# Patient Record
Sex: FEMALE | Race: BLACK OR AFRICAN AMERICAN | NOT HISPANIC OR LATINO | Employment: PART TIME | URBAN - METROPOLITAN AREA
[De-identification: names, ages, dates, MRNs, and addresses within clinical notes are randomized per-mention and may not be internally consistent; named-entity substitution may affect disease eponyms.]

---

## 2022-04-22 ENCOUNTER — TELEPHONE (OUTPATIENT)
Dept: NEUROSURGERY | Facility: CLINIC | Age: 72
End: 2022-04-22
Payer: MEDICARE

## 2022-04-22 NOTE — TELEPHONE ENCOUNTER
----- Message from Rosy Abdul sent at 4/22/2022  2:55 PM CDT -----  .Type:  Patient Returning Call    Who Called:.Shania Cobos   Who Left Message for Patient:Selma  Does the patient know what this is regarding?:  Would the patient rather a call back or a response via [facility]? Call back  Best Call Back Number:.[phone]   Additional Information:

## 2022-04-22 NOTE — TELEPHONE ENCOUNTER
Left vm for pt to gather more information and to see if pt have any mri images to bring in with her to her appt, pt didn't answer left call back # for pt to return call.

## 2022-04-25 ENCOUNTER — TELEPHONE (OUTPATIENT)
Dept: NEUROSURGERY | Facility: CLINIC | Age: 72
End: 2022-04-25
Payer: MEDICARE

## 2022-04-25 NOTE — TELEPHONE ENCOUNTER
Spoke with pt to remind pt to bring in mri disc & report to her appt pt states that she will obtain a copy of the disc from [facility] and bring it in to her appt pt nuria

## 2022-04-26 ENCOUNTER — OFFICE VISIT (OUTPATIENT)
Dept: NEUROSURGERY | Facility: CLINIC | Age: 72
End: 2022-04-26
Payer: MEDICARE

## 2022-04-26 VITALS
BODY MASS INDEX: 31.71 KG/M2 | HEART RATE: 69 BPM | RESPIRATION RATE: 16 BRPM | HEIGHT: 63 IN | SYSTOLIC BLOOD PRESSURE: 160 MMHG | WEIGHT: 179 LBS | DIASTOLIC BLOOD PRESSURE: 85 MMHG

## 2022-04-26 DIAGNOSIS — M54.9 DORSALGIA, UNSPECIFIED: ICD-10-CM

## 2022-04-26 DIAGNOSIS — M48.062 LUMBAR STENOSIS WITH NEUROGENIC CLAUDICATION: ICD-10-CM

## 2022-04-26 DIAGNOSIS — M51.36 DEGENERATIVE DISC DISEASE, LUMBAR: Primary | ICD-10-CM

## 2022-04-26 DIAGNOSIS — M54.16 LUMBAR RADICULOPATHY: ICD-10-CM

## 2022-04-26 DIAGNOSIS — M43.16 SPONDYLOLISTHESIS, LUMBAR REGION: ICD-10-CM

## 2022-04-26 PROCEDURE — 99999 PR PBB SHADOW E&M-EST. PATIENT-LVL III: CPT | Mod: PBBFAC,,, | Performed by: NEUROLOGICAL SURGERY

## 2022-04-26 PROCEDURE — 99999 PR PBB SHADOW E&M-EST. PATIENT-LVL III: ICD-10-PCS | Mod: PBBFAC,,, | Performed by: NEUROLOGICAL SURGERY

## 2022-04-26 PROCEDURE — 99204 OFFICE O/P NEW MOD 45 MIN: CPT | Mod: S$PBB,,, | Performed by: NEUROLOGICAL SURGERY

## 2022-04-26 PROCEDURE — 99204 PR OFFICE/OUTPT VISIT, NEW, LEVL IV, 45-59 MIN: ICD-10-PCS | Mod: S$PBB,,, | Performed by: NEUROLOGICAL SURGERY

## 2022-04-26 PROCEDURE — 99213 OFFICE O/P EST LOW 20 MIN: CPT | Mod: PBBFAC,PO | Performed by: NEUROLOGICAL SURGERY

## 2022-04-26 RX ORDER — ALPRAZOLAM 0.25 MG/1
0.25 TABLET ORAL DAILY PRN
COMMUNITY
Start: 2021-10-21

## 2022-04-26 RX ORDER — METHOCARBAMOL 750 MG/1
750 TABLET, FILM COATED ORAL 3 TIMES DAILY PRN
Qty: 60 TABLET | Refills: 0 | Status: SHIPPED | OUTPATIENT
Start: 2022-04-26 | End: 2022-05-10

## 2022-04-26 RX ORDER — METHYLPREDNISOLONE 4 MG/1
TABLET ORAL
Qty: 21 EACH | Refills: 0 | Status: SHIPPED | OUTPATIENT
Start: 2022-04-26 | End: 2022-05-10

## 2022-04-26 RX ORDER — GABAPENTIN 100 MG/1
1 CAPSULE ORAL 3 TIMES DAILY
COMMUNITY
Start: 2022-03-04

## 2022-04-26 RX ORDER — METOPROLOL SUCCINATE 100 MG/1
100 TABLET, EXTENDED RELEASE ORAL DAILY
COMMUNITY
Start: 2022-03-01

## 2022-04-26 RX ORDER — VALSARTAN 160 MG/1
160 TABLET ORAL 2 TIMES DAILY
COMMUNITY
Start: 2022-04-19

## 2022-04-26 RX ORDER — TRAMADOL HYDROCHLORIDE 50 MG/1
50 TABLET ORAL EVERY 6 HOURS PRN
Qty: 60 TABLET | Refills: 0 | Status: SHIPPED | OUTPATIENT
Start: 2022-04-26 | End: 2022-05-10

## 2022-04-26 RX ORDER — ZOLPIDEM TARTRATE 10 MG/1
TABLET ORAL
COMMUNITY

## 2022-04-26 NOTE — PROGRESS NOTES
Subjective:      Patient ID: Shania Cobos is a 71 y.o. female.    Chief Complaint: Leg Pain (Pt c/o pain in both legs and is 10/10 in pain. Pt states that the pain occurred 3 years ago from lumbar spinal stenosis and that walking makes it worse and sitting makes it better. Pt is currently taking tylenol and advil for the pain.)    Pt here for low   States that has had this for years    LARRY with Dr Parks which lasted about one month   Right sided pain equal to left   Right was initially worse than the left   No previous back or  ortho surgeries   [facility] PT which she finished   gabapentin didn't work   Takes tylenol and Advil  Ambulates with a rolling walker    states that she has numbness and heaviness in the Lower extremities         Review of Systems   Constitutional: Negative for activity change, appetite change and chills.   HENT: Negative for hearing loss, sore throat and tinnitus.    Eyes: Negative for pain, discharge and itching.   Cardiovascular: Negative for chest pain.   Gastrointestinal: Negative for abdominal pain.   Endocrine: Negative for cold intolerance and heat intolerance.   Genitourinary: Negative for difficulty urinating and dysuria.   Musculoskeletal: Positive for back pain and gait problem.   Allergic/Immunologic: Negative for environmental allergies.   Neurological: Positive for weakness. Negative for dizziness, tremors, light-headedness and headaches.   Hematological: Negative for adenopathy.   Psychiatric/Behavioral: Negative for agitation, behavioral problems and confusion.         Objective:       Neurosurgery Physical Exam  Ortho/SPM Exam  Ortho Exam    Nursing note and vitals reviewed  Gen:Oriented to person, place, and time.             Appears stated age   Skin: no rashes or lesions identified   Head:Normocephalic and atraumatic.  Nose: Nose normal.    Eyes: EOM are normal. Pupils are equal, round, and reactive to light.   Neck: Neck supple. No masses  or lesions palpated  Cardiovascular: Intact distal pulses.    Abdominal: Soft.   Neurological: Alert and oriented to person, place, and time.  No cranial nerve deficit.  Coordination normal. Normal muscle tone  Psychiatric: Normal mood and affect. Behavior is normal.      Back:        Tender bilaterally  Paraspinal muscle spasms     Pain with flexion and extention    Limited secondary to pain  Range of motion      Straight leg raise     Motor   Right Right Left Left  Level Group   5  5  L2 Hip flexor (Psoas)   5  5  L3 Leg extension (Quads)   5  5  L4 Dorsiflexion & foot inversion (Tibialis Anterior)   5  5  L5 Great toe extension ( EHL)   5  5  S1 Foot eversion (Gastroc, PL & PB)     Sensation  NL Decreased (R/L/BL) Level Sensation    X  L2 Anterio-medial thigh   X  L3 Medial thigh around knee   X  L4 Medial foot   X  L5 Dorsum foot   X  S1 Lateral foot     Reflex  2+  Patellar tendon (L4)   2+  Achilles tendon (S1)     MRI lumbar   L4-5 spondylisthesis severe central and foraminal setnosis at this level   DD at multiple other levels without severe stenosis                Assessment:     1. Degenerative disc disease, lumbar    2. Spondylolisthesis, lumbar region    3. Lumbar stenosis with neurogenic claudication    4. Lumbar radiculopathy      Plan:     Degenerative disc disease, lumbar    Spondylolisthesis, lumbar region    Lumbar stenosis with neurogenic claudication    Lumbar radiculopathy      Patient with G1 spondylolisthesis and severe stenosis foraminal bilaterally   Will get flex ex lumbar to look for instability   CT scan for bone anatomy and possible surgical planning   Will upload CD of MRI into our PACS   Robaxin/tramadol/medol dose pack       Thank you for the referral   Please call with any questions    David Rao MD  Neurosurgery     Disclaimer: This note was prepared using a voice recognition system and is likely to have sound alike errors within the text.

## 2022-05-04 ENCOUNTER — HOSPITAL ENCOUNTER (OUTPATIENT)
Dept: RADIOLOGY | Facility: HOSPITAL | Age: 72
Discharge: HOME | End: 2022-05-04
Attending: NEUROLOGICAL SURGERY
Payer: MEDICARE

## 2022-05-04 DIAGNOSIS — M54.9 DORSALGIA, UNSPECIFIED: ICD-10-CM

## 2022-05-04 DIAGNOSIS — M48.062 LUMBAR STENOSIS WITH NEUROGENIC CLAUDICATION: ICD-10-CM

## 2022-05-04 DIAGNOSIS — M43.16 SPONDYLOLISTHESIS, LUMBAR REGION: ICD-10-CM

## 2022-05-04 DIAGNOSIS — M51.36 DEGENERATIVE DISC DISEASE, LUMBAR: ICD-10-CM

## 2022-05-04 PROCEDURE — 72131 CT LUMBAR SPINE W/O DYE: CPT | Mod: TC

## 2022-05-04 PROCEDURE — 72120 X-RAY BEND ONLY L-S SPINE: CPT | Mod: TC

## 2022-05-10 ENCOUNTER — OFFICE VISIT (OUTPATIENT)
Dept: NEUROSURGERY | Facility: CLINIC | Age: 72
End: 2022-05-10
Payer: MEDICARE

## 2022-05-10 VITALS — WEIGHT: 179 LBS | BODY MASS INDEX: 31.71 KG/M2 | HEIGHT: 63 IN

## 2022-05-10 DIAGNOSIS — M51.36 DEGENERATIVE DISC DISEASE, LUMBAR: Primary | ICD-10-CM

## 2022-05-10 DIAGNOSIS — M54.16 LUMBAR RADICULOPATHY: ICD-10-CM

## 2022-05-10 DIAGNOSIS — M43.16 SPONDYLOLISTHESIS, LUMBAR REGION: ICD-10-CM

## 2022-05-10 PROCEDURE — 99213 OFFICE O/P EST LOW 20 MIN: CPT | Mod: PBBFAC,PO | Performed by: NEUROLOGICAL SURGERY

## 2022-05-10 PROCEDURE — 99999 PR PBB SHADOW E&M-EST. PATIENT-LVL III: CPT | Mod: PBBFAC,,, | Performed by: NEUROLOGICAL SURGERY

## 2022-05-10 PROCEDURE — 99214 OFFICE O/P EST MOD 30 MIN: CPT | Mod: S$PBB,,, | Performed by: NEUROLOGICAL SURGERY

## 2022-05-10 PROCEDURE — 99999 PR PBB SHADOW E&M-EST. PATIENT-LVL III: ICD-10-PCS | Mod: PBBFAC,,, | Performed by: NEUROLOGICAL SURGERY

## 2022-05-10 PROCEDURE — 99214 PR OFFICE/OUTPT VISIT, EST, LEVL IV, 30-39 MIN: ICD-10-PCS | Mod: S$PBB,,, | Performed by: NEUROLOGICAL SURGERY

## 2022-05-10 NOTE — PROGRESS NOTES
Subjective:      Patient ID: Shania Cobos is a 71 y.o. female.    Chief Complaint: Follow-up (Pt is here today to f/u w/ xray & ct. Pt /o stabbing/aching pain in her left and bilateral legs assoc w/ numbness in left thigh. Pt stated walking worsen her pain, sitting and laying eases her pain but mostly laying down gives her the most relief. Pt still takes gabapentin and tylenol for pain. Pt is a 9/10 pain today )    Pt here for low back pain follow up with a XR and CT lumbar   Yesterday started having severe leg pain and cramping when she was at the  clinic     LARRY with Dr Parks which lasted about one month   Right sided pain equal to left   Right was initially worse than the left   No previous back or  ortho surgeries   [facility] who prescribed PT which she finished   gabapentin didn't work   Takes tylenol and Advil  Ambulates with a rolling walker at home    states that she has numbness and heaviness in the Lower extremities         Follow-up  Associated symptoms include weakness. Pertinent negatives include no abdominal pain, chest pain, chills, headaches or sore throat.     Review of Systems   Constitutional: Negative for activity change, appetite change and chills.   HENT: Negative for hearing loss, sore throat and tinnitus.    Eyes: Negative for pain, discharge and itching.   Cardiovascular: Negative for chest pain.   Gastrointestinal: Negative for abdominal pain.   Endocrine: Negative for cold intolerance and heat intolerance.   Genitourinary: Negative for difficulty urinating and dysuria.   Musculoskeletal: Positive for back pain and gait problem.   Allergic/Immunologic: Negative for environmental allergies.   Neurological: Positive for weakness. Negative for dizziness, tremors, light-headedness and headaches.   Hematological: Negative for adenopathy.   Psychiatric/Behavioral: Negative for agitation, behavioral problems and confusion.         Objective:       Neurosurgery Physical  Exam  Ortho/SPM Exam  Ortho Exam    Nursing note and vitals reviewed  Gen:Oriented to person, place, and time.             Appears stated age   Skin: no rashes or lesions identified   Head:Normocephalic and atraumatic.  Nose: Nose normal.    Eyes: EOM are normal. Pupils are equal, round, and reactive to light.   Neck: Neck supple. No masses or lesions palpated  Cardiovascular: Intact distal pulses.    Abdominal: Soft.   Neurological: Alert and oriented to person, place, and time.  No cranial nerve deficit.  Coordination normal. Normal muscle tone  Psychiatric: Normal mood and affect. Behavior is normal.      Back:        Tender bilaterally  Paraspinal muscle spasms     Pain with flexion and extention    Limited secondary to pain  Range of motion      Straight leg raise     Motor   Right Right Left Left  Level Group   5  5  L2 Hip flexor (Psoas)   5  5  L3 Leg extension (Quads)   5  5  L4 Dorsiflexion & foot inversion (Tibialis Anterior)   5  5  L5 Great toe extension ( EHL)   5  5  S1 Foot eversion (Gastroc, PL & PB)     Sensation  NL Decreased (R/L/BL) Level Sensation    X  L2 Anterio-medial thigh   X  L3 Medial thigh around knee   X  L4 Medial foot   X  L5 Dorsum foot   X  S1 Lateral foot     Reflex  2+  Patellar tendon (L4)   2+  Achilles tendon (S1)     MRI lumbar   L4-5 spondylisthesis severe central and foraminal setnosis at this level   DD at multiple other levels without severe stenosis       CT  L4-5: Grade 1-2 anterolisthesis of L4 on L5.  Spinal canal stenosis.  Severe hypertrophic facet arthropathy.  Moderate bilateral neural foraminal stenosis.         Assessment:     1. Degenerative disc disease, lumbar    2. Lumbar radiculopathy    3. Spondylolisthesis, lumbar region          Plan:     Degenerative disc disease, lumbar  -     Ambulatory referral/consult to Pain Clinic; Future; Expected date: 05/17/2022    Lumbar radiculopathy  -     Ambulatory referral/consult to Pain Clinic; Future; Expected date:  05/17/2022    Spondylolisthesis, lumbar region  -     Ambulatory referral/consult to Pain Clinic; Future; Expected date: 05/17/2022          Patient with G1 spondylolisthesis and severe stenosis foraminal bilaterally   She has severe back pain and lower extremity symptoms consistent with her symptoms   She would like to be set up for an epidural steroid injection at L4-5     If she does not get any improvement with this she would wish to consider decompression with stabilization at the L4-5 level    The patient was informed of all benefits and potential risk of the operation including but not limited to:  Pain, infection, bleeding, coma, paralysis, death.  Cerebrospinal fluid leak, failure of any instrumentation, the need for additional procedures in the future. No guarantee was given that this procedure would alleviate all of the symptoms.    I will see her back after the injection is complete see how she is doing and see if any further treatments are warranted    Thank you for the referral   Please call with any questions    David Rao MD  Neurosurgery     Disclaimer: This note was prepared using a voice recognition system and is likely to have sound alike errors within the text.

## 2022-09-16 ENCOUNTER — TELEPHONE (OUTPATIENT)
Dept: PAIN MEDICINE | Facility: CLINIC | Age: 72
End: 2022-09-16
Payer: MEDICARE

## 2022-09-16 NOTE — TELEPHONE ENCOUNTER
Reached out to patient to reschedule appointment to a morning time in that same day because the provider will be in procedures. Pt did not answer. No v.m set up     Oneil Cotton  Medical

## 2022-09-16 NOTE — TELEPHONE ENCOUNTER
----- Message from Tal Brown sent at 9/16/2022  2:21 PM CDT -----  Contact: Shania  Type:  Patient Returning Call    Who Called:Shania   Who Left Message for Patient:nurse   Does the patient know what this is regarding?:n/a  Would the patient rather a call back or a response via [facility]? Call back   Best Call Back Number:[phone]  Additional Information: n/a      Thanks   KB